# Patient Record
Sex: MALE | Race: WHITE | NOT HISPANIC OR LATINO | URBAN - METROPOLITAN AREA
[De-identification: names, ages, dates, MRNs, and addresses within clinical notes are randomized per-mention and may not be internally consistent; named-entity substitution may affect disease eponyms.]

---

## 2022-07-19 ENCOUNTER — EMERGENCY (EMERGENCY)
Facility: HOSPITAL | Age: 62
LOS: 1 days | Discharge: HOME | End: 2022-07-19
Attending: EMERGENCY MEDICINE
Payer: MEDICARE

## 2022-07-19 VITALS
TEMPERATURE: 98 F | HEART RATE: 86 BPM | SYSTOLIC BLOOD PRESSURE: 134 MMHG | RESPIRATION RATE: 16 BRPM | DIASTOLIC BLOOD PRESSURE: 91 MMHG | OXYGEN SATURATION: 97 % | WEIGHT: 240.08 LBS

## 2022-07-19 PROCEDURE — 12002 RPR S/N/AX/GEN/TRNK2.6-7.5CM: CPT

## 2022-07-19 PROCEDURE — 12001 RPR S/N/AX/GEN/TRNK 2.5CM/<: CPT

## 2022-07-19 PROCEDURE — 99283 EMERGENCY DEPT VISIT LOW MDM: CPT | Mod: 25

## 2022-07-19 PROCEDURE — 90715 TDAP VACCINE 7 YRS/> IM: CPT

## 2022-07-19 PROCEDURE — 90471 IMMUNIZATION ADMIN: CPT

## 2022-07-19 RX ORDER — TETANUS TOXOID, REDUCED DIPHTHERIA TOXOID AND ACELLULAR PERTUSSIS VACCINE, ADSORBED 5; 2.5; 8; 8; 2.5 [IU]/.5ML; [IU]/.5ML; UG/.5ML; UG/.5ML; UG/.5ML
0.5 SUSPENSION INTRAMUSCULAR ONCE
Refills: 0 | Status: COMPLETED | OUTPATIENT
Start: 2022-07-19 | End: 2022-07-19

## 2022-07-19 RX ADMIN — TETANUS TOXOID, REDUCED DIPHTHERIA TOXOID AND ACELLULAR PERTUSSIS VACCINE, ADSORBED 0.5 MILLILITER(S): 5; 2.5; 8; 8; 2.5 SUSPENSION INTRAMUSCULAR at 12:38

## 2022-07-19 NOTE — ED PROCEDURE NOTE - PROCEDURE ADDITIONAL DETAILS
Laceration location: +1cm linear lac on the volar aspect of the distal left 2nd digit on finger pad, inferior to DIP, does not extend to nail.

## 2022-07-19 NOTE — ED PROVIDER NOTE - CARE PROVIDER_API CALL
Russel Musa (MD)  Plastic Surgery; Surgery of the Hand  [address], [address]  [place] [address]  Phone: [phone]  Fax: [phone]  Follow Up Time:

## 2022-07-19 NOTE — ED PROVIDER NOTE - NSFOLLOWUPINSTRUCTIONS_ED_ALL_ED_FT
- Follow up with your doctor within 2-3 days.   - Return to the ED for any new or worsening symptoms including but not limited to worsening redness, swelling, pain, pus drainage, fevers, etc.  - Keep areas clean and dry  - Please follow-up with your primary care doctor in the next 1-2 days. Please call tomorrow for an appointment. If you cannot follow-up with your primary care doctor please return to the ED for any urgent issues.  - If you have any worsening of symptoms or any other concerns please return to the ED immediately.  - Please continue taking your home medications as directed.   - Follow up with the hand specialist within 5-7 days.     Laceration    A laceration is a cut that goes through all of the layers of the skin and into the tissue that is right under the skin. Some lacerations heal on their own. Others need to be closed with skin adhesive strips, skin glue, stitches (sutures), or staples. Proper laceration care minimizes the risk of infection and helps the laceration to heal better.  If non-absorbable stitches or staples have been placed, they must be taken out within the time frame instructed by your healthcare provider.    SEEK IMMEDIATE MEDICAL CARE IF YOU HAVE ANY OF THE FOLLOWING SYMPTOMS: swelling around the wound, worsening pain, drainage from the wound, red streaking going away from your wound, inability to move finger or toe near the laceration, or discoloration of skin near the laceration. - Follow up with your doctor within 2-3 days.   - Return to the ED for any new or worsening symptoms including but not limited to worsening redness, swelling, pain, pus drainage, fevers, etc.  - Keep areas clean and dry  - Please follow-up with your primary care doctor in the next 1-2 days. Please call tomorrow for an appointment. If you cannot follow-up with your primary care doctor please return to the ED for any urgent issues.  - If you have any worsening of symptoms or any other concerns please return to the ED immediately.  - Please continue taking your home medications as directed.   - Take antibiotics as prescribed. Take medication with food to prevent upset stomach.   - Follow up with the hand specialist within 5-7 days.     Laceration    A laceration is a cut that goes through all of the layers of the skin and into the tissue that is right under the skin. Some lacerations heal on their own. Others need to be closed with skin adhesive strips, skin glue, stitches (sutures), or staples. Proper laceration care minimizes the risk of infection and helps the laceration to heal better.  If non-absorbable stitches or staples have been placed, they must be taken out within the time frame instructed by your healthcare provider.    SEEK IMMEDIATE MEDICAL CARE IF YOU HAVE ANY OF THE FOLLOWING SYMPTOMS: swelling around the wound, worsening pain, drainage from the wound, red streaking going away from your wound, inability to move finger or toe near the laceration, or discoloration of skin near the laceration.

## 2022-07-19 NOTE — ED PROVIDER NOTE - CLINICAL SUMMARY MEDICAL DECISION MAKING FREE TEXT BOX
tdap updated. tdap updated. abx prescribed 61 yo male with pmhx TBI presents with 2 lacerations to the left index finger >24 hrs ago. wound care provided. tdap updated. abx prescribed. hand f/u given. strict return precautions explained.

## 2022-07-19 NOTE — ED PROVIDER NOTE - OBJECTIVE STATEMENT
61 yo male with pmhx TBI presents with lacerations to the left index finger >24 hrs ago. Pt states he cut his finger on a metal fence yesterday morning, states that the fence was sharp and metal.     Denies fever, chills, chest pain, SOB 61 yo male with pmhx TBI presents with lacerations to the left index finger >24 hrs ago. Pt states he cut his finger on a metal fence yesterday morning, states that the fence was sharp and metal.   Denies redness around the laceration, tingling/numbness    Denies fever, chills, chest pain, SOB 61 yo male with pmhx TBI presents with lacerations to the left index finger >24 hrs ago. Pt states he cut his finger on a metal fence yesterday morning, states that the fence was sharp and metal. States that the bleeding was controlled with holding pressure on it. Pt denies any pain in the finger, states he came here today because the wound was still this morning. Denies redness around the laceration, tingling/numbness, streaking red from the area, or warmth to the area. Pt states he has been able to use the left hand normally.     Denies fever, chills, chest pain, SOB, abd pain, N/V  Pt unknown of when last tdap was. 63 yo male with pmhx TBI presents with lacerations to the left index finger >24 hrs ago. Pt states he cut his finger on a metal fence yesterday morning, states that the fence was sharp and metal. States that the bleeding was controlled with holding pressure on it. Pt denies any pain in the finger, states he came here today because the wound was still bleeding minimally this morning. Denies redness around the laceration, tingling/numbness, streaking red from the area, or warmth to the area. Pt states he has been able to use the left hand normally. Denies fever, chills, chest pain, SOB, abd pain, N/V, paresthesias in the extremities, changes in color or coolness to the extremities.   Pt unknown of when last tdap was.

## 2022-07-19 NOTE — ED PROCEDURE NOTE - CPROC ED TIME OUT STATEMENT1
“Patient's name, , procedure and correct site were confirmed during the [facility] Timeout.”
“Patient's name, , procedure and correct site were confirmed during the [facility] Timeout.”

## 2022-07-19 NOTE — ED PROVIDER NOTE - PHYSICAL EXAMINATION
Gen: No acute distress, non toxic  HEENT: Mucous membranes moist, pink conjunctivae, EOMI  CV: RRR, nl s1/s2.  Resp: CTAB, normal rate and effort  GI: Abdomen soft, NT, ND. No rebound, no guarding  : No CVAT  Neuro: A&O x 3, moving all 4 extremities  MSK:   Skin: No rashes. intact and perfused.       +2.5 cm lac on the volar aspect of the 2nd left digit over the PIP joint. +1cm lac on the volar aspect of the left 2nd digit finger pad. No evidence of cellulitic processes.   tendon intact on 2nd left digit Gen: No acute distress, non toxic  HEENT: Mucous membranes moist, pink conjunctivae, EOMI  CV: RRR, nl s1/s2.  Resp: CTAB, normal rate and effort  GI: Abdomen soft, NT, ND. No rebound, no guarding  Neuro: A&Ox4, MAEx4. 5/5 str ext x 4. Sensation intact, symmetric throughout. No FND's.   MSK:   Skin: No rashes. intact and perfused.       +2.5 cm lac on the volar aspect of the 2nd left digit over the PIP joint. +1cm lac on the volar aspect of the left 2nd digit finger pad. No evidence of cellulitic processes.   tendon intact on 2nd left digit  compartments soft/compressible. Gen: No acute distress, non toxic  HEENT: Mucous membranes moist, pink conjunctivae, EOMI  CV: RRR, nl s1/s2.  Resp: CTAB, normal rate and effort  GI: Abdomen soft, NT, ND. No rebound, no guarding  Neuro: A&Ox4, MAEx4. 5/5 str ext x 4. Sensation intact, symmetric throughout. No FND's.   MSK:   Skin: No rashes and perfused.       +2.5 cm linear lac, with distinct borders on the volar aspect of the 2nd left digit over the PIP joint. +1cm lac on the volar aspect of the left 2nd digit finger pad. No evidence of cellulitic processes.   tendon intact on 2nd left digit  compartments soft/compressible. Gen: No acute distress, non toxic.   HEENT: NCAT. Mucous membranes moist, pink conjunctivae, EOMI. PERRL. Airway patent, uvula midline. posterior pharynx w/out erythema or exudate.   CV: RRR, nl s1/s2.  Resp: CTAB, normal rate and effort. No wheezes, rhonchi, or crackles. No signs of respiratory distress.   GI: Abdomen soft, NT, ND. No rebound, no guarding  Neuro: A&Ox4, MAEx4. 5/5 str ext x 4. Sensation intact, symmetric throughout. No FND's. 2 point discrimination intact.   MSK: FROM UE b/l. tendon intact on 2nd left digit, full extension and flexion of 2nd left digit. No visualized or palpable deformities. compartments soft/compressible.   Skin: +2.5 cm linear lac, minimally gapping with distinct borders on the volar aspect of the 2nd left digit over the PIP joint. +1cm linear lac on the volar aspect of the distal left 2nd digit on finger pad, inferior to DIP, does not extend to nail. Nail bed intact. No surrounding erythema, streaking red from the area, pus drainage, warmth or ttp. No evidence of cellulitic processes. No rashes and well perfused. Cap refill <2sec  Vascular: Radial and ulnar pulses 2+ b/l

## 2022-07-19 NOTE — ED PROVIDER NOTE - NS ED ROS FT
Gen: denies fever, chills  Skin: +laceration to left 2nd digit.   HEENT: denies visual changes  Respiratory: denies SOB  Cardiovascular: denies chest pain, palpitations  GI: denies abdominal pain, n/v  MSK: denies joint swelling/pain, back pain, neck pain  Neuro: denies headache, dizziness, weakness, numbness

## 2022-07-19 NOTE — ED PROVIDER NOTE - NS ED ATTENDING STATEMENT MOD
This was a shared visit with the CANDIDA. I reviewed and verified the documentation and independently performed the documented:

## 2022-07-19 NOTE — ED PROVIDER NOTE - ATTENDING APP SHARED VISIT CONTRIBUTION OF CARE
Shirley MUÑOZ- 61 Y/O M with h/o TBI and memory issues p/w left hand lacerations to left second finger when he got caught in a fence and was initially appeared like two small cuts but today it bled again when he brushed against something and that prompted him for eval. Injury occurred around 4 pm yesterday, lat tetanus he believes is<10 yrs but cannot be sure. Pt is rt handed    Pt is alert, well appearing male, s1s2 normal reg, b/l clear breath sounds, abd soft, nt,nd, neuro exam aox3, baseline memory loss,  left hand second finger laceration over pip palmar aspect 2cm with minimal gapping, dried blood , and another 0.5 cm superficial laceration over distal phalanx left second finger palmar aspect, mild gapping, dried blood noted    injury is >24 hr old and already healing but still has gapping, will place Dermabond ad Steri-strips to promote healing, place on Augmentin, supplement tdap and discharge with medicaid cab

## 2022-07-19 NOTE — ED PROVIDER NOTE - PROGRESS NOTE DETAILS
Pt reassessed, pt feeling better at this time, vss, pt able to walk, talk and vocalized plan of action. Verbally discussed. discharge. Discussed in depth and explained to pt in depth the next steps that need to be taken including proper follow up with PCP or specialists. All incidental findings were discussed with pt as well. Pt verbalized their concerns and all questions were answered. Pt understands dispo. Given good instructions when to return to ED, strict return precautions and importance of f/u. Hand f/u information given.

## 2022-07-19 NOTE — ED ADULT TRIAGE NOTE - CHIEF COMPLAINT QUOTE
Pt with hx of TBI presents ambulatory from [facility] [facility] for lacerations to L hand 2nd digit that happened yesterday on a fence. Pt states he will need assistance finding his way home.

## 2022-07-19 NOTE — ED PROCEDURE NOTE - PROCEDURE ADDITIONAL DETAILS
Laceration location: +2.5 cm linear lac, minimally gapping with distinct borders on the volar aspect of the 2nd left digit over the PIP joint.

## 2022-07-19 NOTE — ED PROVIDER NOTE - PATIENT PORTAL LINK FT
You can access the FollowMyHealth Patient Portal offered by [facility] by registering at the following website: [email or web address]. By joining [employer]’s FollowMyHealth portal, you will also be able to view your health information using other applications (apps) compatible with our system.

## 2024-03-26 PROBLEM — S06.9X9A UNSPECIFIED INTRACRANIAL INJURY WITH LOSS OF CONSCIOUSNESS OF UNSPECIFIED DURATION, INITIAL ENCOUNTER: Chronic | Status: ACTIVE | Noted: 2022-07-19

## 2024-04-02 ENCOUNTER — APPOINTMENT (OUTPATIENT)
Dept: NUCLEAR MEDICINE | Facility: CLINIC | Age: 64
End: 2024-04-02
Payer: MEDICARE

## 2024-04-02 PROCEDURE — A9586: CPT

## 2024-04-03 PROCEDURE — A9586: CPT

## 2024-04-03 PROCEDURE — 78814 PET IMAGE W/CT LMTD: CPT | Mod: MH

## 2024-04-03 PROCEDURE — 78999 UNLISTED MISC PX DX NUC MED: CPT | Mod: MH

## 2024-05-13 NOTE — ED PROVIDER NOTE - CARE PROVIDERS DIRECT ADDRESSES
May 13, 2024      [facility]  [address]  [place] [address]  Phone: [phone]  Fax: [phone]       Patient: Ray Amaya   YOB: 1995  Date of Visit: 05/13/2024    To Whom It May Concern:    Sita Amaya  was at [facility] on 05/13/2024. The patient may return to work on 05/14/2024 with restrictions. No heavy weight lifting or excessive bending.  If you have any questions or concerns, or if I can be of further assistance, please do not hesitate to contact me.    Sincerely,    Jana Mena MD     
,DirectAddress_Unknown

## 2024-08-06 ENCOUNTER — OFFICE (OUTPATIENT)
Dept: URBAN - METROPOLITAN AREA CLINIC 113 | Facility: CLINIC | Age: 64
Setting detail: OPHTHALMOLOGY
End: 2024-08-06
Payer: MEDICARE

## 2024-08-06 DIAGNOSIS — H43.811: ICD-10-CM

## 2024-08-06 DIAGNOSIS — H02.403: ICD-10-CM

## 2024-08-06 DIAGNOSIS — H25.13: ICD-10-CM

## 2024-08-06 DIAGNOSIS — H01.004: ICD-10-CM

## 2024-08-06 DIAGNOSIS — H02.834: ICD-10-CM

## 2024-08-06 DIAGNOSIS — H52.7: ICD-10-CM

## 2024-08-06 DIAGNOSIS — H02.831: ICD-10-CM

## 2024-08-06 DIAGNOSIS — H01.001: ICD-10-CM

## 2024-08-06 DIAGNOSIS — H47.091: ICD-10-CM

## 2024-08-06 PROCEDURE — 92015 DETERMINE REFRACTIVE STATE: CPT | Performed by: STUDENT IN AN ORGANIZED HEALTH CARE EDUCATION/TRAINING PROGRAM

## 2024-08-06 PROCEDURE — 92004 COMPRE OPH EXAM NEW PT 1/>: CPT | Performed by: STUDENT IN AN ORGANIZED HEALTH CARE EDUCATION/TRAINING PROGRAM

## 2024-08-06 ASSESSMENT — LID POSITION - DERMATOCHALASIS
OD_DERMATOCHALASIS: RUL
OS_DERMATOCHALASIS: LUL

## 2024-08-06 ASSESSMENT — CONFRONTATIONAL VISUAL FIELD TEST (CVF)
OD_FINDINGS: FULL
OS_FINDINGS: FULL

## 2024-08-06 ASSESSMENT — LID POSITION - PTOSIS
OD_PTOSIS: RUL
OS_PTOSIS: LUL

## 2024-08-06 ASSESSMENT — LID EXAM ASSESSMENTS
OD_BLEPHARITIS: T
OS_BLEPHARITIS: T